# Patient Record
Sex: MALE | Race: WHITE | URBAN - METROPOLITAN AREA
[De-identification: names, ages, dates, MRNs, and addresses within clinical notes are randomized per-mention and may not be internally consistent; named-entity substitution may affect disease eponyms.]

---

## 2017-03-17 ENCOUNTER — OFFICE VISIT (OUTPATIENT)
Dept: CARDIOLOGY | Facility: CLINIC | Age: 37
End: 2017-03-17
Attending: NURSE PRACTITIONER
Payer: COMMERCIAL

## 2017-03-17 VITALS
DIASTOLIC BLOOD PRESSURE: 64 MMHG | HEART RATE: 84 BPM | HEIGHT: 72 IN | BODY MASS INDEX: 31.15 KG/M2 | WEIGHT: 230 LBS | SYSTOLIC BLOOD PRESSURE: 112 MMHG

## 2017-03-17 DIAGNOSIS — I48.91 ATRIAL FIBRILLATION, UNSPECIFIED TYPE (H): ICD-10-CM

## 2017-03-17 PROCEDURE — 99213 OFFICE O/P EST LOW 20 MIN: CPT | Mod: 25 | Performed by: NURSE PRACTITIONER

## 2017-03-17 PROCEDURE — 93000 ELECTROCARDIOGRAM COMPLETE: CPT | Performed by: NURSE PRACTITIONER

## 2017-03-17 NOTE — PROGRESS NOTES
HPI and Plan:   Mr. Gautam is a delightful 37-year-old gentleman well known to me here at the clinic. He has a history of atrial fibrillation with wide-circumferential pulmonary vein isolation and linear ablation for his AFib. He also had empiric cavotricuspid isthmus. This took place on 08/17/2016 with Dr. Christiansen.       Due to his persistent AFib, he did have a slight drop of his EF with an ejection fraction of 50%-55% noted on transesophageal echo.       His metoprolol, lisinopril and Xarelto were discontinued 6 months ago. He has not had any recurrence of his AFib, to his knowledge. He is pleased with how well he is feeling. 12-lead EKG was sinus rhythm at 88 beats per minute.       All other review of systems, past medical history and physical exam are noted below.       ASSESSMENT AND PLAN: Mr. Gautam is a delightful 37-year-old gentleman here today for followup. He has a history of persistent atrial fibrillation with successful ablation as stated above. 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       I have him follow up in 1 year and see Dr. Christiansen, with an echo and EKG.       Violeta REGALADO, CNP    Encounter Diagnosis   Name Primary?     Atrial fibrillation, unspecified type (H)        CURRENT MEDICATIONS:  No current outpatient prescriptions on file.       ALLERGIES   No Known Allergies    PAST MEDICAL HISTORY:  Past Medical History   Diagnosis Date     Persistent atrial fibrillation (H) 1/12/16     ?6-7 yrs ago       PAST SURGICAL HISTORY:  Past Surgical History   Procedure Laterality Date     Genitourinary surgery       vasectomy 2009     Cardioversion  2/16/     failed     H ablation focal afib  8/17/16       FAMILY HISTORY:  Family History   Problem Relation Age of Onset     Adopted: Yes     Coronary Artery Disease No family hx of        SOCIAL HISTORY:  Social History     Social History     Marital status:      Spouse name: N/A     Number of children: N/A      Years of education: N/A     Social History Main Topics     Smoking status: Never Smoker     Smokeless tobacco: None     Alcohol use 0.0 oz/week     0 Standard drinks or equivalent per week      Comment: 2-3 month     Drug use: No     Sexual activity: Yes     Partners: Female     Other Topics Concern     Caffeine Concern Yes     2-3 times per week, soda or energy drink     Special Diet No     Exercise Yes     Social History Narrative       Review of Systems:  Skin:  Negative       Eyes:  Negative      ENT:  Positive for nasal congestion;postnasal drainage    Respiratory:  Negative       Cardiovascular:  Negative      Gastroenterology: Negative      Genitourinary:  Negative      Musculoskeletal:  Negative      Neurologic:  Negative      Psychiatric:  Negative      Heme/Lymph/Imm:  Negative      Endocrine:  Negative        Physical Exam:  Vitals: /64  Pulse 84  Ht 1.829 m (6')  Wt 104.3 kg (230 lb)  BMI 31.19 kg/m2    Constitutional:  cooperative, alert and oriented, well developed, well nourished, in no acute distress        Skin:  warm and dry to the touch, no apparent skin lesions or masses noted        Head:  normocephalic, no masses or lesions        Eyes:  pupils equal and round, conjunctivae and lids unremarkable, sclera white, no xanthalasma, EOMS intact, no nystagmus        ENT:  no pallor or cyanosis, dentition good        Neck: JVP normal    Chest:  Clear    Cardiac: regular rhythm, normal S1/S2, no S3 or S4, apical impulse not displaced, no murmurs, gallops or rubs                  Extremities and Back:  no deformities, clubbing, cyanosis, erythema observed;no edema              Neurological:  affect appropriate, oriented to time, person and place              FABRICIO Ballesteros CNP  [facility]  [id number] [id number]  [place] [address]

## 2017-03-17 NOTE — MR AVS SNAPSHOT
After Visit Summary   3/17/2017    Delon Gautam    MRN: [id number]           Patient Information     Date Of Birth          1980        Visit Information        Provider Department      3/17/2017 8:50 AM Violeta Lo APRN CNP [facility]        Today's Diagnoses     Atrial fibrillation, unspecified type (H)           Follow-ups after your visit        Who to contact     If you have questions or need follow up information about today's clinic visit or your schedule please contact [facility] directly at [phone].  Normal or non-critical lab and imaging results will be communicated to you by [employer], letter or phone within 4 business days after the clinic has received the results. If you do not hear from us within 7 days, please contact the clinic through [employer] or phone. If you have a critical or abnormal lab result, we will notify you by phone as soon as possible.  Submit refill requests through [employer] or call your pharmacy and they will forward the refill request to us. Please allow 3 business days for your refill to be completed.          Additional Information About Your Visit        MyChart Information     [employer] gives you secure access to your electronic health record. If you see a primary care provider, you can also send messages to your care team and make appointments. If you have questions, please call your primary care clinic.  If you do not have a primary care provider, please call [phone] and they will assist you.        Care EveryWhere ID     This is your Care EveryWhere ID. This could be used by other organizations to access your [facility] medical records  [id number]        Your Vitals Were     Pulse Height BMI (Body Mass Index)             84 1.829 m (6') 31.19 kg/m2          Blood Pressure from Last 3 Encounters:   03/17/17 112/64   12/16/16 110/70   09/29/16 110/74    Weight from Last  3 Encounters:   03/17/17 104.3 kg (230 lb)   12/16/16 104.3 kg (230 lb)   09/29/16 105 kg (231 lb 8 oz)              We Performed the Following     EKG 12-lead complete w/read - Clinics     Follow-Up with Cardiac Advanced Practice Provider        Primary Care Provider    Physician No Ref-Primary       No address on file        Thank you!     Thank you for choosing [facility]  for your care. Our goal is always to provide you with excellent care. Hearing back from our patients is one way we can continue to improve our services. Please take a few minutes to complete the written survey that you may receive in the mail after your visit with us. Thank you!             Your Updated Medication List - Protect others around you: Learn how to safely use, store and throw away your medicines at www.disposemymeds.org.      Notice  As of 3/17/2017  9:33 AM    You have not been prescribed any medications.

## 2019-11-06 ENCOUNTER — HEALTH MAINTENANCE LETTER (OUTPATIENT)
Age: 39
End: 2019-11-06

## 2020-11-29 ENCOUNTER — HEALTH MAINTENANCE LETTER (OUTPATIENT)
Age: 40
End: 2020-11-29

## 2021-09-19 ENCOUNTER — HEALTH MAINTENANCE LETTER (OUTPATIENT)
Age: 41
End: 2021-09-19

## 2022-01-09 ENCOUNTER — HEALTH MAINTENANCE LETTER (OUTPATIENT)
Age: 42
End: 2022-01-09

## 2022-11-21 ENCOUNTER — HEALTH MAINTENANCE LETTER (OUTPATIENT)
Age: 42
End: 2022-11-21

## 2023-04-16 ENCOUNTER — HEALTH MAINTENANCE LETTER (OUTPATIENT)
Age: 43
End: 2023-04-16